# Patient Record
Sex: FEMALE | Race: WHITE | Employment: OTHER | URBAN - METROPOLITAN AREA
[De-identification: names, ages, dates, MRNs, and addresses within clinical notes are randomized per-mention and may not be internally consistent; named-entity substitution may affect disease eponyms.]

---

## 2022-03-18 PROBLEM — N39.0 RECURRENT UTI: Status: ACTIVE | Noted: 2018-10-18

## 2024-11-03 ENCOUNTER — HOSPITAL ENCOUNTER (INPATIENT)
Facility: HOSPITAL | Age: 79
LOS: 2 days | Discharge: HOME | DRG: 313 | End: 2024-11-05
Attending: INTERNAL MEDICINE | Admitting: INTERNAL MEDICINE
Payer: MEDICARE

## 2024-11-03 ENCOUNTER — APPOINTMENT (OUTPATIENT)
Facility: HOSPITAL | Age: 79
DRG: 313 | End: 2024-11-03
Payer: MEDICARE

## 2024-11-03 DIAGNOSIS — R07.9 CHEST PAIN, UNSPECIFIED TYPE: ICD-10-CM

## 2024-11-03 DIAGNOSIS — I25.10 CORONARY ARTERY DISEASE INVOLVING NATIVE CORONARY ARTERY OF NATIVE HEART WITHOUT ANGINA PECTORIS: Primary | ICD-10-CM

## 2024-11-03 DIAGNOSIS — R79.89 ELEVATED TROPONIN: ICD-10-CM

## 2024-11-03 DIAGNOSIS — Z90.5 S/P NEPHRECTOMY: ICD-10-CM

## 2024-11-03 DIAGNOSIS — N18.9 CHRONIC KIDNEY DISEASE, UNSPECIFIED CKD STAGE: ICD-10-CM

## 2024-11-03 PROBLEM — J90 PLEURAL EFFUSION: Status: ACTIVE | Noted: 2024-11-03

## 2024-11-03 LAB
ALBUMIN SERPL-MCNC: 3.3 G/DL (ref 3.4–5)
ALBUMIN/GLOB SERPL: 0.7 (ref 0.8–1.7)
ALP SERPL-CCNC: 114 U/L (ref 45–117)
ALT SERPL-CCNC: 33 U/L (ref 13–56)
ANION GAP SERPL CALC-SCNC: 7 MMOL/L (ref 3–18)
AST SERPL-CCNC: 30 U/L (ref 10–38)
BASOPHILS # BLD: 0 K/UL (ref 0–0.1)
BASOPHILS NFR BLD: 0 % (ref 0–2)
BILIRUB SERPL-MCNC: 0.8 MG/DL (ref 0.2–1)
BUN SERPL-MCNC: 19 MG/DL (ref 7–18)
BUN/CREAT SERPL: 12 (ref 12–20)
CALCIUM SERPL-MCNC: 9.5 MG/DL (ref 8.5–10.1)
CHLORIDE SERPL-SCNC: 100 MMOL/L (ref 100–111)
CO2 SERPL-SCNC: 28 MMOL/L (ref 21–32)
CREAT SERPL-MCNC: 1.55 MG/DL (ref 0.6–1.3)
D DIMER PPP FEU-MCNC: 1.56 UG/ML(FEU)
DIFFERENTIAL METHOD BLD: ABNORMAL
EOSINOPHIL # BLD: 0 K/UL (ref 0–0.4)
EOSINOPHIL NFR BLD: 0 % (ref 0–5)
ERYTHROCYTE [DISTWIDTH] IN BLOOD BY AUTOMATED COUNT: 13.8 % (ref 11.6–14.5)
GLOBULIN SER CALC-MCNC: 4.5 G/DL (ref 2–4)
GLUCOSE BLD STRIP.AUTO-MCNC: 202 MG/DL (ref 70–110)
GLUCOSE SERPL-MCNC: 109 MG/DL (ref 74–99)
HCT VFR BLD AUTO: 35.3 % (ref 35–45)
HGB BLD-MCNC: 11.9 G/DL (ref 12–16)
IMM GRANULOCYTES # BLD AUTO: 0.1 K/UL (ref 0–0.04)
IMM GRANULOCYTES NFR BLD AUTO: 1 % (ref 0–0.5)
LYMPHOCYTES # BLD: 2.7 K/UL (ref 0.9–3.6)
LYMPHOCYTES NFR BLD: 21 % (ref 21–52)
MCH RBC QN AUTO: 30.3 PG (ref 24–34)
MCHC RBC AUTO-ENTMCNC: 33.7 G/DL (ref 31–37)
MCV RBC AUTO: 89.8 FL (ref 78–100)
MONOCYTES # BLD: 1.7 K/UL (ref 0.05–1.2)
MONOCYTES NFR BLD: 13 % (ref 3–10)
NEUTS SEG # BLD: 8.4 K/UL (ref 1.8–8)
NEUTS SEG NFR BLD: 65 % (ref 40–73)
NRBC # BLD: 0 K/UL (ref 0–0.01)
NRBC BLD-RTO: 0 PER 100 WBC
PLATELET # BLD AUTO: 219 K/UL (ref 135–420)
PMV BLD AUTO: 11 FL (ref 9.2–11.8)
POTASSIUM SERPL-SCNC: 3.3 MMOL/L (ref 3.5–5.5)
PROT SERPL-MCNC: 7.8 G/DL (ref 6.4–8.2)
RBC # BLD AUTO: 3.93 M/UL (ref 4.2–5.3)
RBC MORPH BLD: ABNORMAL
SODIUM SERPL-SCNC: 135 MMOL/L (ref 136–145)
TROPONIN I SERPL HS-MCNC: 60 NG/L (ref 0–54)
TROPONIN I SERPL HS-MCNC: 72 NG/L (ref 0–54)
TROPONIN I SERPL HS-MCNC: 72 NG/L (ref 0–54)
WBC # BLD AUTO: 12.9 K/UL (ref 4.6–13.2)

## 2024-11-03 PROCEDURE — 85025 COMPLETE CBC W/AUTO DIFF WBC: CPT

## 2024-11-03 PROCEDURE — 84484 ASSAY OF TROPONIN QUANT: CPT

## 2024-11-03 PROCEDURE — 99285 EMERGENCY DEPT VISIT HI MDM: CPT

## 2024-11-03 PROCEDURE — 3430000000 HC RX DIAGNOSTIC RADIOPHARMACEUTICAL: Performed by: PHYSICIAN ASSISTANT

## 2024-11-03 PROCEDURE — 6370000000 HC RX 637 (ALT 250 FOR IP): Performed by: PHYSICIAN ASSISTANT

## 2024-11-03 PROCEDURE — 1100000003 HC PRIVATE W/ TELEMETRY

## 2024-11-03 PROCEDURE — 71250 CT THORAX DX C-: CPT

## 2024-11-03 PROCEDURE — 2580000003 HC RX 258: Performed by: INTERNAL MEDICINE

## 2024-11-03 PROCEDURE — 82962 GLUCOSE BLOOD TEST: CPT

## 2024-11-03 PROCEDURE — 93005 ELECTROCARDIOGRAM TRACING: CPT | Performed by: PHYSICIAN ASSISTANT

## 2024-11-03 PROCEDURE — 78580 LUNG PERFUSION IMAGING: CPT

## 2024-11-03 PROCEDURE — 85379 FIBRIN DEGRADATION QUANT: CPT

## 2024-11-03 PROCEDURE — A9540 TC99M MAA: HCPCS | Performed by: PHYSICIAN ASSISTANT

## 2024-11-03 PROCEDURE — 6360000002 HC RX W HCPCS: Performed by: INTERNAL MEDICINE

## 2024-11-03 PROCEDURE — 71045 X-RAY EXAM CHEST 1 VIEW: CPT

## 2024-11-03 PROCEDURE — 6370000000 HC RX 637 (ALT 250 FOR IP): Performed by: INTERNAL MEDICINE

## 2024-11-03 PROCEDURE — 80053 COMPREHEN METABOLIC PANEL: CPT

## 2024-11-03 RX ORDER — DEXTROSE MONOHYDRATE 100 MG/ML
INJECTION, SOLUTION INTRAVENOUS CONTINUOUS PRN
Status: DISCONTINUED | OUTPATIENT
Start: 2024-11-03 | End: 2024-11-05 | Stop reason: HOSPADM

## 2024-11-03 RX ORDER — INSULIN LISPRO 100 [IU]/ML
0.05 INJECTION, SOLUTION INTRAVENOUS; SUBCUTANEOUS
Status: DISCONTINUED | OUTPATIENT
Start: 2024-11-04 | End: 2024-11-05 | Stop reason: HOSPADM

## 2024-11-03 RX ORDER — SODIUM CHLORIDE 0.9 % (FLUSH) 0.9 %
5-40 SYRINGE (ML) INJECTION EVERY 12 HOURS SCHEDULED
Status: DISCONTINUED | OUTPATIENT
Start: 2024-11-03 | End: 2024-11-05 | Stop reason: HOSPADM

## 2024-11-03 RX ORDER — POLYETHYLENE GLYCOL 3350 17 G/17G
17 POWDER, FOR SOLUTION ORAL DAILY PRN
Status: DISCONTINUED | OUTPATIENT
Start: 2024-11-03 | End: 2024-11-05 | Stop reason: HOSPADM

## 2024-11-03 RX ORDER — ONDANSETRON 2 MG/ML
4 INJECTION INTRAMUSCULAR; INTRAVENOUS EVERY 6 HOURS PRN
Status: DISCONTINUED | OUTPATIENT
Start: 2024-11-03 | End: 2024-11-05 | Stop reason: HOSPADM

## 2024-11-03 RX ORDER — INSULIN GLARGINE 100 [IU]/ML
0.15 INJECTION, SOLUTION SUBCUTANEOUS DAILY
Status: DISCONTINUED | OUTPATIENT
Start: 2024-11-03 | End: 2024-11-05 | Stop reason: HOSPADM

## 2024-11-03 RX ORDER — CARVEDILOL 3.12 MG/1
3.12 TABLET ORAL 2 TIMES DAILY WITH MEALS
Status: DISCONTINUED | OUTPATIENT
Start: 2024-11-04 | End: 2024-11-05 | Stop reason: HOSPADM

## 2024-11-03 RX ORDER — SODIUM CHLORIDE 9 MG/ML
INJECTION, SOLUTION INTRAVENOUS PRN
Status: DISCONTINUED | OUTPATIENT
Start: 2024-11-03 | End: 2024-11-05 | Stop reason: HOSPADM

## 2024-11-03 RX ORDER — SODIUM CHLORIDE 0.9 % (FLUSH) 0.9 %
5-40 SYRINGE (ML) INJECTION PRN
Status: DISCONTINUED | OUTPATIENT
Start: 2024-11-03 | End: 2024-11-05 | Stop reason: HOSPADM

## 2024-11-03 RX ORDER — ONDANSETRON 2 MG/ML
4 INJECTION INTRAMUSCULAR; INTRAVENOUS EVERY 6 HOURS PRN
Status: DISCONTINUED | OUTPATIENT
Start: 2024-11-03 | End: 2024-11-04 | Stop reason: SDUPTHER

## 2024-11-03 RX ORDER — ONDANSETRON 4 MG/1
4 TABLET, ORALLY DISINTEGRATING ORAL EVERY 8 HOURS PRN
Status: DISCONTINUED | OUTPATIENT
Start: 2024-11-03 | End: 2024-11-04 | Stop reason: SDUPTHER

## 2024-11-03 RX ORDER — MAGNESIUM SULFATE IN WATER 40 MG/ML
2000 INJECTION, SOLUTION INTRAVENOUS PRN
Status: DISCONTINUED | OUTPATIENT
Start: 2024-11-03 | End: 2024-11-03 | Stop reason: SDUPTHER

## 2024-11-03 RX ORDER — ENOXAPARIN SODIUM 100 MG/ML
30 INJECTION SUBCUTANEOUS 2 TIMES DAILY
Status: DISCONTINUED | OUTPATIENT
Start: 2024-11-03 | End: 2024-11-05 | Stop reason: HOSPADM

## 2024-11-03 RX ORDER — ACETAMINOPHEN 325 MG/1
650 TABLET ORAL EVERY 6 HOURS PRN
Status: DISCONTINUED | OUTPATIENT
Start: 2024-11-03 | End: 2024-11-05 | Stop reason: HOSPADM

## 2024-11-03 RX ORDER — ACETAMINOPHEN 325 MG/1
650 TABLET ORAL EVERY 6 HOURS PRN
Status: DISCONTINUED | OUTPATIENT
Start: 2024-11-03 | End: 2024-11-03 | Stop reason: SDUPTHER

## 2024-11-03 RX ORDER — ACETAMINOPHEN 650 MG/1
650 SUPPOSITORY RECTAL EVERY 6 HOURS PRN
Status: DISCONTINUED | OUTPATIENT
Start: 2024-11-03 | End: 2024-11-05 | Stop reason: HOSPADM

## 2024-11-03 RX ORDER — ONDANSETRON 4 MG/1
4 TABLET, ORALLY DISINTEGRATING ORAL EVERY 8 HOURS PRN
Status: DISCONTINUED | OUTPATIENT
Start: 2024-11-03 | End: 2024-11-05 | Stop reason: HOSPADM

## 2024-11-03 RX ORDER — POLYETHYLENE GLYCOL 3350 17 G/17G
17 POWDER, FOR SOLUTION ORAL DAILY PRN
Status: DISCONTINUED | OUTPATIENT
Start: 2024-11-03 | End: 2024-11-03 | Stop reason: SDUPTHER

## 2024-11-03 RX ORDER — MAGNESIUM SULFATE IN WATER 40 MG/ML
2000 INJECTION, SOLUTION INTRAVENOUS PRN
Status: DISCONTINUED | OUTPATIENT
Start: 2024-11-03 | End: 2024-11-05 | Stop reason: HOSPADM

## 2024-11-03 RX ORDER — POTASSIUM CHLORIDE 1500 MG/1
40 TABLET, EXTENDED RELEASE ORAL ONCE
Status: COMPLETED | OUTPATIENT
Start: 2024-11-03 | End: 2024-11-03

## 2024-11-03 RX ORDER — FLUTICASONE PROPIONATE AND SALMETEROL 250; 50 UG/1; UG/1
1 POWDER RESPIRATORY (INHALATION) EVERY 12 HOURS
COMMUNITY

## 2024-11-03 RX ORDER — INSULIN LISPRO 100 [IU]/ML
0-4 INJECTION, SOLUTION INTRAVENOUS; SUBCUTANEOUS
Status: DISCONTINUED | OUTPATIENT
Start: 2024-11-03 | End: 2024-11-05 | Stop reason: HOSPADM

## 2024-11-03 RX ORDER — ACETAMINOPHEN 650 MG/1
650 SUPPOSITORY RECTAL EVERY 6 HOURS PRN
Status: DISCONTINUED | OUTPATIENT
Start: 2024-11-03 | End: 2024-11-03 | Stop reason: SDUPTHER

## 2024-11-03 RX ORDER — ATORVASTATIN CALCIUM 20 MG/1
80 TABLET, FILM COATED ORAL NIGHTLY
Status: DISCONTINUED | OUTPATIENT
Start: 2024-11-03 | End: 2024-11-05 | Stop reason: HOSPADM

## 2024-11-03 RX ORDER — NITROGLYCERIN 0.4 MG/1
0.4 TABLET SUBLINGUAL EVERY 5 MIN PRN
Status: DISCONTINUED | OUTPATIENT
Start: 2024-11-03 | End: 2024-11-05 | Stop reason: HOSPADM

## 2024-11-03 RX ORDER — GLUCAGON 1 MG/ML
1 KIT INJECTION PRN
Status: DISCONTINUED | OUTPATIENT
Start: 2024-11-03 | End: 2024-11-05 | Stop reason: HOSPADM

## 2024-11-03 RX ADMIN — ENOXAPARIN SODIUM 30 MG: 100 INJECTION SUBCUTANEOUS at 22:04

## 2024-11-03 RX ADMIN — SODIUM CHLORIDE, PRESERVATIVE FREE 10 ML: 5 INJECTION INTRAVENOUS at 22:49

## 2024-11-03 RX ADMIN — INSULIN LISPRO 1 UNITS: 100 INJECTION, SOLUTION INTRAVENOUS; SUBCUTANEOUS at 22:07

## 2024-11-03 RX ADMIN — INSULIN GLARGINE 15 UNITS: 100 INJECTION, SOLUTION SUBCUTANEOUS at 22:06

## 2024-11-03 RX ADMIN — KIT FOR THE PREPARATION OF TECHNETIUM TC 99M ALBUMIN AGGREGATED 7.2 MILLICURIE: 2.5 INJECTION, POWDER, FOR SOLUTION INTRAVENOUS at 19:48

## 2024-11-03 RX ADMIN — POTASSIUM CHLORIDE 40 MEQ: 1500 TABLET, EXTENDED RELEASE ORAL at 22:48

## 2024-11-03 RX ADMIN — ALUMINUM HYDROXIDE, MAGNESIUM HYDROXIDE, AND SIMETHICONE 40 ML: 200; 200; 20 SUSPENSION ORAL at 18:27

## 2024-11-03 RX ADMIN — ATORVASTATIN CALCIUM 80 MG: 20 TABLET, FILM COATED ORAL at 22:00

## 2024-11-03 ASSESSMENT — PAIN SCALES - GENERAL: PAINLEVEL_OUTOF10: 3

## 2024-11-03 ASSESSMENT — PAIN DESCRIPTION - LOCATION: LOCATION: NECK

## 2024-11-03 NOTE — ED TRIAGE NOTES
Pt arrived by EMS c/o pain on sides of neck bilat. Pt able to wave her hands above her head without complaints. Pt states L arm pain last night, but pain has now moved to neck. Denies SOB, denies chest pain at this time.     Hx COPD, asthma, diabetes. Daily smoker \"her whole life\".    324mg asa en route.

## 2024-11-03 NOTE — ED PROVIDER NOTES
[facility] EMERGENCY DEPT  EMERGENCY DEPARTMENT ENCOUNTER       Pt Name: Veronica Melgoza  MRN: [id number]  Birthdate 1945  Date of evaluation: 11/3/2024  PCP: Nae Montgomery MD  Note Started: 7:39 PM 11/3/24     CHIEF COMPLAINT       Chief Complaint   Patient presents with    Neck Pain        HISTORY OF PRESENT ILLNESS: 1 or more elements      History From: Patient and Patient's Daughter  HPI Limitations: None  Chronic Conditions: DM, depression, bladder Ca, depression, CKD, arthritis, HLD, COPD, DM, cervical fusion, chronic low back pain  Social Determinants affecting Dx or Tx: none      Veronica Melgoza is a 78 y.o. female who presents to ED from home via EMS c/o upper chest pain across her clavicles radiating to her left upper arm and anterior neck onset yesterday evening.  She states that if she is sitting still the pain is minimal but if she takes a deep breath or moves a certain way or lays flat then it is worse and is associated with slight shortness of breath.  EMS gave 324 chewable aspirin en route.  He has a history of cervical fusion but denies any neck pain or chronic neck issues.  She has chronic low back pain and is awaiting an injection next week.  She denies fever, cough, congestion, any other symptoms or complaints.  She also states she has been belching a little more last couple days.  She has a history of right thyroid nodule which was biopsied within the past year and states this workup was negative     Nursing Notes were all reviewed and agreed with or any disagreements were addressed in the HPI.    PAST HISTORY     Past Medical History:  Past Medical History:   Diagnosis Date    Arthropathy, unspecified, site unspecified     Asthma     Bladder CA in situ     Depression     Essential hypertension     Kidney disease        Past Surgical History:  Past Surgical History:   Procedure Laterality Date    CHOLECYSTECTOMY      INTRAOCULAR LENS INSERTION      NEPHRECTOMY      Right    ORTHOPEDIC SURGERY

## 2024-11-04 ENCOUNTER — APPOINTMENT (OUTPATIENT)
Facility: HOSPITAL | Age: 79
DRG: 313 | End: 2024-11-04
Attending: INTERNAL MEDICINE
Payer: MEDICARE

## 2024-11-04 ENCOUNTER — APPOINTMENT (OUTPATIENT)
Facility: HOSPITAL | Age: 79
DRG: 313 | End: 2024-11-04
Payer: MEDICARE

## 2024-11-04 PROBLEM — E11.9 DM (DIABETES MELLITUS) (HCC): Status: ACTIVE | Noted: 2024-11-04

## 2024-11-04 LAB
ALBUMIN SERPL-MCNC: 2.9 G/DL (ref 3.4–5)
ALBUMIN/GLOB SERPL: 0.6 (ref 0.8–1.7)
ALP SERPL-CCNC: 103 U/L (ref 45–117)
ALT SERPL-CCNC: 30 U/L (ref 13–56)
ANION GAP SERPL CALC-SCNC: 6 MMOL/L (ref 3–18)
AST SERPL-CCNC: 27 U/L (ref 10–38)
BILIRUB SERPL-MCNC: 0.9 MG/DL (ref 0.2–1)
BUN SERPL-MCNC: 20 MG/DL (ref 7–18)
BUN/CREAT SERPL: 14 (ref 12–20)
CALCIUM SERPL-MCNC: 9.2 MG/DL (ref 8.5–10.1)
CHLORIDE SERPL-SCNC: 102 MMOL/L (ref 100–111)
CO2 SERPL-SCNC: 28 MMOL/L (ref 21–32)
CREAT SERPL-MCNC: 1.38 MG/DL (ref 0.6–1.3)
ECHO AO ASC DIAM: 3.3 CM
ECHO AO ASCENDING AORTA INDEX: 1.6 CM/M2
ECHO AO ROOT DIAM: 3.2 CM
ECHO AO ROOT INDEX: 1.55 CM/M2
ECHO AV AREA PEAK VELOCITY: 2.2 CM2
ECHO AV AREA VTI: 2.1 CM2
ECHO AV AREA/BSA PEAK VELOCITY: 1.1 CM2/M2
ECHO AV AREA/BSA VTI: 1 CM2/M2
ECHO AV MEAN GRADIENT: 4 MMHG
ECHO AV MEAN VELOCITY: 0.9 M/S
ECHO AV PEAK GRADIENT: 7 MMHG
ECHO AV PEAK VELOCITY: 1.3 M/S
ECHO AV VELOCITY RATIO: 0.69
ECHO AV VTI: 29.8 CM
ECHO BSA: 2.15 M2
ECHO BSA: 2.15 M2
ECHO EST RA PRESSURE: 3 MMHG
ECHO LA DIAMETER INDEX: 1.65 CM/M2
ECHO LA DIAMETER: 3.4 CM
ECHO LA TO AORTIC ROOT RATIO: 1.06
ECHO LA VOL A-L A2C: 55 ML (ref 22–52)
ECHO LA VOL A-L A4C: 47 ML (ref 22–52)
ECHO LA VOL BP: 51 ML (ref 22–52)
ECHO LA VOL MOD A2C: 53 ML (ref 22–52)
ECHO LA VOL MOD A4C: 44 ML (ref 22–52)
ECHO LA VOL/BSA BIPLANE: 25 ML/M2 (ref 16–34)
ECHO LA VOLUME AREA LENGTH: 54 ML
ECHO LA VOLUME INDEX A-L A2C: 27 ML/M2 (ref 16–34)
ECHO LA VOLUME INDEX A-L A4C: 23 ML/M2 (ref 16–34)
ECHO LA VOLUME INDEX AREA LENGTH: 26 ML/M2 (ref 16–34)
ECHO LA VOLUME INDEX MOD A2C: 26 ML/M2 (ref 16–34)
ECHO LA VOLUME INDEX MOD A4C: 21 ML/M2 (ref 16–34)
ECHO LV E' LATERAL VELOCITY: 6.2 CM/S
ECHO LV E' SEPTAL VELOCITY: 4.5 CM/S
ECHO LV EJECTION FRACTION A2C: 53 %
ECHO LV EJECTION FRACTION A4C: 42 %
ECHO LV EJECTION FRACTION BIPLANE: 48 % (ref 55–100)
ECHO LV FRACTIONAL SHORTENING: 34 % (ref 28–44)
ECHO LV GLOBAL LONGITUDINAL STRAIN (GLS): -11 %
ECHO LV GLOBAL LONGITUDINAL STRAIN (GLS): -11.7 %
ECHO LV GLOBAL LONGITUDINAL STRAIN (GLS): -12.7 %
ECHO LV GLOBAL LONGITUDINAL STRAIN (GLS): -15.4 %
ECHO LV INTERNAL DIMENSION DIASTOLE INDEX: 2.14 CM/M2
ECHO LV INTERNAL DIMENSION DIASTOLIC: 4.4 CM (ref 3.9–5.3)
ECHO LV INTERNAL DIMENSION SYSTOLIC INDEX: 1.41 CM/M2
ECHO LV INTERNAL DIMENSION SYSTOLIC: 2.9 CM
ECHO LV IVSD: 0.9 CM (ref 0.6–0.9)
ECHO LV MASS 2D: 137.8 G (ref 67–162)
ECHO LV MASS INDEX 2D: 66.9 G/M2 (ref 43–95)
ECHO LV POSTERIOR WALL DIASTOLIC: 1 CM (ref 0.6–0.9)
ECHO LV RELATIVE WALL THICKNESS RATIO: 0.45
ECHO LVOT AREA: 3.1 CM2
ECHO LVOT AV VTI INDEX: 0.69
ECHO LVOT DIAM: 2 CM
ECHO LVOT MEAN GRADIENT: 2 MMHG
ECHO LVOT PEAK GRADIENT: 3 MMHG
ECHO LVOT PEAK VELOCITY: 0.9 M/S
ECHO LVOT STROKE VOLUME INDEX: 31.4 ML/M2
ECHO LVOT SV: 64.7 ML
ECHO LVOT VTI: 20.6 CM
ECHO MV A VELOCITY: 1 M/S
ECHO MV AREA VTI: 1.7 CM2
ECHO MV E DECELERATION TIME (DT): 165.7 MS
ECHO MV E VELOCITY: 0.85 M/S
ECHO MV E/A RATIO: 0.85
ECHO MV E/E' LATERAL: 13.71
ECHO MV E/E' RATIO (AVERAGED): 16.3
ECHO MV E/E' SEPTAL: 18.89
ECHO MV LVOT VTI INDEX: 1.84
ECHO MV MAX VELOCITY: 1.2 M/S
ECHO MV MEAN GRADIENT: 2 MMHG
ECHO MV MEAN VELOCITY: 0.7 M/S
ECHO MV PEAK GRADIENT: 6 MMHG
ECHO MV VTI: 37.9 CM
ECHO PV MAX VELOCITY: 1 M/S
ECHO PV MEAN GRADIENT: 2 MMHG
ECHO PV MEAN VELOCITY: 0.7 M/S
ECHO PV PEAK GRADIENT: 4 MMHG
ECHO RA END SYSTOLIC VOLUME APICAL 4 CHAMBER INDEX BSA: 15 ML/M2
ECHO RA VOLUME: 30 ML
ECHO RIGHT VENTRICULAR SYSTOLIC PRESSURE (RVSP): 23 MMHG
ECHO RV FREE WALL PEAK S': 11 CM/S
ECHO RV INTERNAL DIMENSION: 3 CM
ECHO RV TAPSE: 1.8 CM (ref 1.7–?)
ECHO RVOT MEAN GRADIENT: 1 MMHG
ECHO RVOT PEAK GRADIENT: 2 MMHG
ECHO RVOT PEAK VELOCITY: 0.7 M/S
ECHO RVOT VTI: 16.9 CM
ECHO TV REGURGITANT MAX VELOCITY: 2.22 M/S
ECHO TV REGURGITANT PEAK GRADIENT: 20 MMHG
ERYTHROCYTE [DISTWIDTH] IN BLOOD BY AUTOMATED COUNT: 13.9 % (ref 11.6–14.5)
EST. AVERAGE GLUCOSE BLD GHB EST-MCNC: 120 MG/DL
GLOBULIN SER CALC-MCNC: 5 G/DL (ref 2–4)
GLUCOSE BLD STRIP.AUTO-MCNC: 101 MG/DL (ref 70–110)
GLUCOSE BLD STRIP.AUTO-MCNC: 116 MG/DL (ref 70–110)
GLUCOSE BLD STRIP.AUTO-MCNC: 123 MG/DL (ref 70–110)
GLUCOSE BLD STRIP.AUTO-MCNC: 146 MG/DL (ref 70–110)
GLUCOSE SERPL-MCNC: 106 MG/DL (ref 74–99)
HBA1C MFR BLD: 5.8 % (ref 4.2–5.6)
HCT VFR BLD AUTO: 34.2 % (ref 35–45)
HGB BLD-MCNC: 11.4 G/DL (ref 12–16)
MAGNESIUM SERPL-MCNC: 1.7 MG/DL (ref 1.6–2.6)
MCH RBC QN AUTO: 30.2 PG (ref 24–34)
MCHC RBC AUTO-ENTMCNC: 33.3 G/DL (ref 31–37)
MCV RBC AUTO: 90.7 FL (ref 78–100)
NRBC # BLD: 0 K/UL (ref 0–0.01)
NRBC BLD-RTO: 0 PER 100 WBC
PLATELET # BLD AUTO: 198 K/UL (ref 135–420)
PMV BLD AUTO: 11.9 FL (ref 9.2–11.8)
POTASSIUM SERPL-SCNC: 3.3 MMOL/L (ref 3.5–5.5)
PROT SERPL-MCNC: 7.9 G/DL (ref 6.4–8.2)
RBC # BLD AUTO: 3.77 M/UL (ref 4.2–5.3)
SODIUM SERPL-SCNC: 136 MMOL/L (ref 136–145)
TROPONIN I SERPL HS-MCNC: 181 NG/L (ref 0–54)
TROPONIN I SERPL HS-MCNC: 72 NG/L (ref 0–54)
TROPONIN I SERPL HS-MCNC: 94 NG/L (ref 0–54)
WBC # BLD AUTO: 9.3 K/UL (ref 4.6–13.2)

## 2024-11-04 PROCEDURE — 93970 EXTREMITY STUDY: CPT

## 2024-11-04 PROCEDURE — 1100000003 HC PRIVATE W/ TELEMETRY

## 2024-11-04 PROCEDURE — 84484 ASSAY OF TROPONIN QUANT: CPT

## 2024-11-04 PROCEDURE — 6370000000 HC RX 637 (ALT 250 FOR IP): Performed by: HOSPITALIST

## 2024-11-04 PROCEDURE — 36415 COLL VENOUS BLD VENIPUNCTURE: CPT

## 2024-11-04 PROCEDURE — 83036 HEMOGLOBIN GLYCOSYLATED A1C: CPT

## 2024-11-04 PROCEDURE — 93306 TTE W/DOPPLER COMPLETE: CPT

## 2024-11-04 PROCEDURE — 6370000000 HC RX 637 (ALT 250 FOR IP): Performed by: INTERNAL MEDICINE

## 2024-11-04 PROCEDURE — 6360000002 HC RX W HCPCS: Performed by: INTERNAL MEDICINE

## 2024-11-04 PROCEDURE — 83735 ASSAY OF MAGNESIUM: CPT

## 2024-11-04 PROCEDURE — 85027 COMPLETE CBC AUTOMATED: CPT

## 2024-11-04 PROCEDURE — 2580000003 HC RX 258: Performed by: INTERNAL MEDICINE

## 2024-11-04 PROCEDURE — 80053 COMPREHEN METABOLIC PANEL: CPT

## 2024-11-04 PROCEDURE — 82962 GLUCOSE BLOOD TEST: CPT

## 2024-11-04 RX ORDER — POTASSIUM CHLORIDE 1500 MG/1
40 TABLET, EXTENDED RELEASE ORAL ONCE
Status: COMPLETED | OUTPATIENT
Start: 2024-11-04 | End: 2024-11-04

## 2024-11-04 RX ADMIN — POTASSIUM CHLORIDE 40 MEQ: 1500 TABLET, EXTENDED RELEASE ORAL at 09:49

## 2024-11-04 RX ADMIN — SODIUM CHLORIDE, PRESERVATIVE FREE 10 ML: 5 INJECTION INTRAVENOUS at 08:44

## 2024-11-04 RX ADMIN — ENOXAPARIN SODIUM 30 MG: 100 INJECTION SUBCUTANEOUS at 08:43

## 2024-11-04 RX ADMIN — INSULIN LISPRO 5 UNITS: 100 INJECTION, SOLUTION INTRAVENOUS; SUBCUTANEOUS at 16:39

## 2024-11-04 RX ADMIN — ACETAMINOPHEN 650 MG: 325 TABLET ORAL at 20:55

## 2024-11-04 RX ADMIN — SODIUM CHLORIDE, PRESERVATIVE FREE 10 ML: 5 INJECTION INTRAVENOUS at 20:52

## 2024-11-04 RX ADMIN — ENOXAPARIN SODIUM 30 MG: 100 INJECTION SUBCUTANEOUS at 20:53

## 2024-11-04 RX ADMIN — CARVEDILOL 3.12 MG: 3.12 TABLET, FILM COATED ORAL at 08:43

## 2024-11-04 RX ADMIN — SODIUM CHLORIDE, PRESERVATIVE FREE 10 ML: 5 INJECTION INTRAVENOUS at 08:43

## 2024-11-04 RX ADMIN — INSULIN LISPRO 5 UNITS: 100 INJECTION, SOLUTION INTRAVENOUS; SUBCUTANEOUS at 11:48

## 2024-11-04 RX ADMIN — ATORVASTATIN CALCIUM 80 MG: 20 TABLET, FILM COATED ORAL at 20:52

## 2024-11-04 RX ADMIN — CARVEDILOL 3.12 MG: 3.12 TABLET, FILM COATED ORAL at 16:38

## 2024-11-04 ASSESSMENT — PAIN SCALES - GENERAL
PAINLEVEL_OUTOF10: 0
PAINLEVEL_OUTOF10: 6
PAINLEVEL_OUTOF10: 0
PAINLEVEL_OUTOF10: 6
PAINLEVEL_OUTOF10: 8

## 2024-11-04 ASSESSMENT — PAIN DESCRIPTION - ORIENTATION
ORIENTATION: LEFT
ORIENTATION: LEFT

## 2024-11-04 ASSESSMENT — PAIN SCALES - WONG BAKER
WONGBAKER_NUMERICALRESPONSE: HURTS LITTLE MORE
WONGBAKER_NUMERICALRESPONSE: NO HURT

## 2024-11-04 ASSESSMENT — PAIN DESCRIPTION - DESCRIPTORS: DESCRIPTORS: ACHING

## 2024-11-04 ASSESSMENT — PAIN DESCRIPTION - LOCATION
LOCATION: BACK;SHOULDER
LOCATION: NECK

## 2024-11-04 NOTE — H&P
Glucose:   Recent Labs     11/03/24  1732   GLUCOSE 109*      POC Glucose: No results found for: \"POCGLU\"   Last 3 POC Glucose: No results for input(s): \"POCGLU\" in the last 72 hours.   Last 3 CK, CKMB, Troponin: No results for input(s): \"CKTOTAL\", \"CKMB\", \"TROPONINI\" in the last 72 hours.   CBC:   Lab Results   Component Value Date/Time    WBC 12.9 11/03/2024 05:32 PM    RBC 3.93 11/03/2024 05:32 PM    HGB 11.9 11/03/2024 05:32 PM    HCT 35.3 11/03/2024 05:32 PM    MCV 89.8 11/03/2024 05:32 PM    MCH 30.3 11/03/2024 05:32 PM    MCHC 33.7 11/03/2024 05:32 PM    RDW 13.8 11/03/2024 05:32 PM     11/03/2024 05:32 PM    MPV 11.0 11/03/2024 05:32 PM      Last 3 Hemoglobin/Hematocrit:   Recent Labs     11/03/24  1732   HGB 11.9*   HCT 35.3      Recent Labs     11/03/24  1732   ALKPHOS 114   ALT 33   AST 30   BILITOT 0.8      Albumin: No results found for: \"LABALBU\"   Calcium:   Lab Results   Component Value Date/Time    CALCIUM 9.5 11/03/2024 05:32 PM      Ionized Calcium: No components found for: \"IONCA\"         Imaging results last 24 hrs :XR CHEST PORTABLE    Result Date: 11/3/2024  EXAM:  XR CHEST PORTABLE INDICATION:   Chest Pain COMPARISON: None. FINDINGS: AP radiograph of the chest was obtained. Small left pleural effusion with left basilar atelectasis. No pneumothorax. Heart size is normal. Calcifications of the thoracic aorta. Cervical spine ACDF. No acute osseous abnormalities.     1. Small left pleural effusion and left basilar atelectasis. Electronically signed by Thor Kaur    Imaging results impression onlyXR CHEST PORTABLE    Result Date: 11/3/2024  1. Small left pleural effusion and left basilar atelectasis. Electronically signed by Thor Kaur     NM LUNG SCAN PERFUSION ONLY   Final Result   Normal perfusion lung imaging study. Probability of pulmonary   embolism is low.                  Electronically signed by DELMA LOFTON      XR CHEST PORTABLE   Final Result   1. Small left pleural

## 2024-11-04 NOTE — PLAN OF CARE
Problem: Discharge Planning  Goal: Discharge to home or other facility with appropriate resources  Outcome: Progressing  Flowsheets (Taken 11/3/2024 2236)  Discharge to home or other facility with appropriate resources:   Identify barriers to discharge with patient and caregiver   Identify discharge learning needs (meds, wound care, etc)     Problem: Safety - Adult  Goal: Free from fall injury  Outcome: Progressing     Problem: Pain  Goal: Verbalizes/displays adequate comfort level or baseline comfort level  Outcome: Progressing

## 2024-11-04 NOTE — PROCEDURES
PROCEDURE NOTE  Date: 11/4/2024   Name: Veronica Melgoza  YOB: 1945    ProceduresNuclear Stress Test    Pt had VQ scan on 11/3/24. Cannot do Nuclear Stress test until 11/5/24 due to it using same isotope. Will notify MD and RN.

## 2024-11-04 NOTE — CARE COORDINATION
Patient admitted from home. Patient lives at home with daughter in law and stated that she gets assist from them as needed at home. Patient no longer drives and stated that her family gets her to and from apts. Patient stated that she is able to obtain meds as needed without barriers. Plan is for cardiology work up. Patient does not have a cardiologist therefore will need a follow up apt prior to DC. PT/OT to see patient in acute setting prior to DC. SW provided patient info card to follow up with CM team as needed. SW to follow.   11/04/24 1053   Service Assessment   Patient Orientation Alert and Oriented   Cognition Alert   History Provided By Patient   Primary Caregiver Self   Support Systems Family Members   Patient's Healthcare Decision Maker is: Named in Scanned ACP Document   PCP Verified by CM Yes   Last Visit to PCP Within last 3 months   Prior Functional Level Independent in ADLs/IADLs   Current Functional Level Independent in ADLs/IADLs   Can patient return to prior living arrangement No   Ability to make needs known: Good   Family able to assist with home care needs: Yes   Would you like for me to discuss the discharge plan with any other family members/significant others, and if so, who? No   Financial Resources Other (Comment)   Community Resources None   CM/SW Referral Other (see comment)   Social/Functional History   Lives With Family   Type of Home House   Home Layout One level   ADL Assistance Independent   Homemaking Assistance Independent   Homemaking Responsibilities Yes   Ambulation Assistance Needs assistance   Active  No   Patient's  Info daughter to transport   Mode of Transportation Car   Occupation Retired   Discharge Planning   Type of Residence House   Living Arrangements Family Members   Current Services Prior To Admission None   Potential Assistance Needed Other (Comment)   DME Ordered? No   Potential Assistance Purchasing Medications No   Type of Home Care Services None

## 2024-11-04 NOTE — CONSULTS
[facility] Consult Note      Patient: Veronica Melgoza MRN: [id number]  SSN: [id number]    YOB: 1945  Age: 78 y.o.  Sex: female    Date of Consultation: 11/4/2024    Reason for Consultation: Chest pain/neck pain    HPI:  I was asked by Ele Mckinley to see this pleasant patient for chest pain. 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 came to the hospital with left shoulder and neck pain from last 4 days with minimal troponin elevation  Chest pain/neck pain is intermittent and have no relationship to physical activity  No known history of CAD  Denied any history of DVT or pulmonary embolism or prolonged bedrest             Past Medical History:   Diagnosis Date    Arthropathy, unspecified, site unspecified     Asthma     Bladder CA in situ     Depression     Essential hypertension     Kidney disease      Past Surgical History:   Procedure Laterality Date    CHOLECYSTECTOMY      INTRAOCULAR LENS INSERTION      NEPHRECTOMY      Right    ORTHOPEDIC SURGERY      Neck    OTHER SURGICAL HISTORY      Bladder Tumor     Current Facility-Administered Medications   Medication Dose Route Frequency    glucose chewable tablet 16 g  4 tablet Oral PRN    dextrose bolus 10% 125 mL  125 mL IntraVENous PRN    Or    dextrose bolus 10% 250 mL  250 mL IntraVENous PRN    glucagon injection 1 mg  1 mg SubCUTAneous PRN    dextrose 10 % infusion   IntraVENous Continuous PRN    insulin glargine (LANTUS) injection vial 15 Units  0.15 Units/kg SubCUTAneous Daily    insulin lispro (HUMALOG,ADMELOG) injection vial 5 Units  0.05 Units/kg SubCUTAneous TID WC    insulin lispro (HUMALOG,ADMELOG) injection vial 0-4 Units  0-4 Units SubCUTAneous 4x Daily AC & HS    sodium chloride flush 0.9 % injection 5-40 mL  5-40 mL IntraVENous 2 times per day    sodium chloride flush 0.9 % injection 5-40 mL

## 2024-11-04 NOTE — PLAN OF CARE
Problem: Discharge Planning  Goal: Discharge to home or other facility with appropriate resources  11/4/2024 1218 by Zenobia Huerta RN  Outcome: Progressing  Flowsheets (Taken 11/4/2024 1218)  Discharge to home or other facility with appropriate resources:   Identify barriers to discharge with patient and caregiver   Arrange for needed discharge resources and transportation as appropriate   Identify discharge learning needs (meds, wound care, etc)   Refer to discharge planning if patient needs post-hospital services based on physician order or complex needs related to functional status, cognitive ability or social support system  11/4/2024 0025 by Elia Stringer RN  Outcome: Progressing  Flowsheets (Taken 11/3/2024 2236)  Discharge to home or other facility with appropriate resources:   Identify barriers to discharge with patient and caregiver   Identify discharge learning needs (meds, wound care, etc)     Problem: Safety - Adult  Goal: Free from fall injury  11/4/2024 1218 by Zenobia Huerta RN  Outcome: Progressing  11/4/2024 0025 by Elia Stringer RN  Outcome: Progressing     Problem: Pain  Goal: Verbalizes/displays adequate comfort level or baseline comfort level  11/4/2024 1218 by Zenobia Huerta RN  Outcome: Progressing  Flowsheets (Taken 11/4/2024 1218)  Verbalizes/displays adequate comfort level or baseline comfort level:   Encourage patient to monitor pain and request assistance   Assess pain using appropriate pain scale   Administer analgesics based on type and severity of pain and evaluate response   Implement non-pharmacological measures as appropriate and evaluate response   Consider cultural and social influences on pain and pain management   Notify Licensed Independent Practitioner if interventions unsuccessful or patient reports new pain  11/4/2024 0025 by Elia Stringer RN  Outcome: Progressing     Problem: Chronic Conditions and Co-morbidities  Goal: Patient's chronic conditions and co-morbidity symptoms

## 2024-11-05 ENCOUNTER — APPOINTMENT (OUTPATIENT)
Facility: HOSPITAL | Age: 79
DRG: 313 | End: 2024-11-05
Payer: MEDICARE

## 2024-11-05 VITALS
DIASTOLIC BLOOD PRESSURE: 71 MMHG | SYSTOLIC BLOOD PRESSURE: 145 MMHG | OXYGEN SATURATION: 99 % | RESPIRATION RATE: 18 BRPM | HEART RATE: 72 BPM | HEIGHT: 64 IN | WEIGHT: 227.51 LBS | BODY MASS INDEX: 38.84 KG/M2 | TEMPERATURE: 98.4 F

## 2024-11-05 PROBLEM — R07.9 CHEST PAIN: Status: RESOLVED | Noted: 2024-11-03 | Resolved: 2024-11-05

## 2024-11-05 LAB
ALBUMIN SERPL-MCNC: 3 G/DL (ref 3.4–5)
ALBUMIN/GLOB SERPL: 0.6 (ref 0.8–1.7)
ALP SERPL-CCNC: 132 U/L (ref 45–117)
ALT SERPL-CCNC: 46 U/L (ref 13–56)
ANION GAP SERPL CALC-SCNC: 6 MMOL/L (ref 3–18)
AST SERPL-CCNC: 57 U/L (ref 10–38)
BILIRUB SERPL-MCNC: 0.7 MG/DL (ref 0.2–1)
BUN SERPL-MCNC: 20 MG/DL (ref 7–18)
BUN/CREAT SERPL: 16 (ref 12–20)
CALCIUM SERPL-MCNC: 9 MG/DL (ref 8.5–10.1)
CHLORIDE SERPL-SCNC: 106 MMOL/L (ref 100–111)
CO2 SERPL-SCNC: 24 MMOL/L (ref 21–32)
CREAT SERPL-MCNC: 1.27 MG/DL (ref 0.6–1.3)
ECHO BSA: 2.15 M2
EKG ATRIAL RATE: 78 BPM
EKG ATRIAL RATE: 78 BPM
EKG DIAGNOSIS: NORMAL
EKG P AXIS: 43 DEGREES
EKG P AXIS: 47 DEGREES
EKG P-R INTERVAL: 144 MS
EKG P-R INTERVAL: 154 MS
EKG Q-T INTERVAL: 362 MS
EKG Q-T INTERVAL: 374 MS
EKG QRS DURATION: 90 MS
EKG QRS DURATION: 92 MS
EKG QTC CALCULATION (BAZETT): 412 MS
EKG QTC CALCULATION (BAZETT): 426 MS
EKG R AXIS: -15 DEGREES
EKG R AXIS: -21 DEGREES
EKG T AXIS: 51 DEGREES
EKG T AXIS: 62 DEGREES
EKG VENTRICULAR RATE: 78 BPM
EKG VENTRICULAR RATE: 78 BPM
ERYTHROCYTE [DISTWIDTH] IN BLOOD BY AUTOMATED COUNT: 13.9 % (ref 11.6–14.5)
GLOBULIN SER CALC-MCNC: 4.8 G/DL (ref 2–4)
GLUCOSE BLD STRIP.AUTO-MCNC: 126 MG/DL (ref 70–110)
GLUCOSE BLD STRIP.AUTO-MCNC: 127 MG/DL (ref 70–110)
GLUCOSE BLD STRIP.AUTO-MCNC: 131 MG/DL (ref 70–110)
GLUCOSE SERPL-MCNC: 112 MG/DL (ref 74–99)
HCT VFR BLD AUTO: 33.7 % (ref 35–45)
HGB BLD-MCNC: 11.2 G/DL (ref 12–16)
MAGNESIUM SERPL-MCNC: 1.8 MG/DL (ref 1.6–2.6)
MCH RBC QN AUTO: 30.4 PG (ref 24–34)
MCHC RBC AUTO-ENTMCNC: 33.2 G/DL (ref 31–37)
MCV RBC AUTO: 91.6 FL (ref 78–100)
NRBC # BLD: 0 K/UL (ref 0–0.01)
NRBC BLD-RTO: 0 PER 100 WBC
NUC STRESS EJECTION FRACTION: 58 %
PLATELET # BLD AUTO: 202 K/UL (ref 135–420)
PMV BLD AUTO: 12.1 FL (ref 9.2–11.8)
POTASSIUM SERPL-SCNC: 3.6 MMOL/L (ref 3.5–5.5)
PROT SERPL-MCNC: 7.8 G/DL (ref 6.4–8.2)
RBC # BLD AUTO: 3.68 M/UL (ref 4.2–5.3)
SODIUM SERPL-SCNC: 136 MMOL/L (ref 136–145)
STRESS BASELINE DIAS BP: 67 MMHG
STRESS BASELINE HR: 67 BPM
STRESS BASELINE SYS BP: 139 MMHG
STRESS ESTIMATED WORKLOAD: 1 METS
STRESS PEAK DIAS BP: 67 MMHG
STRESS PEAK SYS BP: 139 MMHG
STRESS PERCENT HR ACHIEVED: 54 %
STRESS POST PEAK HR: 76 BPM
STRESS RATE PRESSURE PRODUCT: NORMAL BPM*MMHG
STRESS TARGET HR: 142 BPM
TID: 1.23
WBC # BLD AUTO: 9.2 K/UL (ref 4.6–13.2)

## 2024-11-05 PROCEDURE — 82962 GLUCOSE BLOOD TEST: CPT

## 2024-11-05 PROCEDURE — 97161 PT EVAL LOW COMPLEX 20 MIN: CPT

## 2024-11-05 PROCEDURE — 97165 OT EVAL LOW COMPLEX 30 MIN: CPT

## 2024-11-05 PROCEDURE — 2580000003 HC RX 258: Performed by: INTERNAL MEDICINE

## 2024-11-05 PROCEDURE — 6370000000 HC RX 637 (ALT 250 FOR IP): Performed by: HOSPITALIST

## 2024-11-05 PROCEDURE — A9500 TC99M SESTAMIBI: HCPCS | Performed by: INTERNAL MEDICINE

## 2024-11-05 PROCEDURE — 80053 COMPREHEN METABOLIC PANEL: CPT

## 2024-11-05 PROCEDURE — 93010 ELECTROCARDIOGRAM REPORT: CPT | Performed by: INTERNAL MEDICINE

## 2024-11-05 PROCEDURE — 36415 COLL VENOUS BLD VENIPUNCTURE: CPT

## 2024-11-05 PROCEDURE — 6360000002 HC RX W HCPCS: Performed by: INTERNAL MEDICINE

## 2024-11-05 PROCEDURE — 6370000000 HC RX 637 (ALT 250 FOR IP): Performed by: INTERNAL MEDICINE

## 2024-11-05 PROCEDURE — 83735 ASSAY OF MAGNESIUM: CPT

## 2024-11-05 PROCEDURE — 93017 CV STRESS TEST TRACING ONLY: CPT

## 2024-11-05 PROCEDURE — 85027 COMPLETE CBC AUTOMATED: CPT

## 2024-11-05 PROCEDURE — 3430000000 HC RX DIAGNOSTIC RADIOPHARMACEUTICAL: Performed by: INTERNAL MEDICINE

## 2024-11-05 RX ORDER — TETRAKIS(2-METHOXYISOBUTYLISOCYANIDE)COPPER(I) TETRAFLUOROBORATE 1 MG/ML
8.8 INJECTION, POWDER, LYOPHILIZED, FOR SOLUTION INTRAVENOUS
Status: COMPLETED | OUTPATIENT
Start: 2024-11-05 | End: 2024-11-05

## 2024-11-05 RX ORDER — TETRAKIS(2-METHOXYISOBUTYLISOCYANIDE)COPPER(I) TETRAFLUOROBORATE 1 MG/ML
29.1 INJECTION, POWDER, LYOPHILIZED, FOR SOLUTION INTRAVENOUS
Status: COMPLETED | OUTPATIENT
Start: 2024-11-05 | End: 2024-11-05

## 2024-11-05 RX ORDER — LIDOCAINE 4 G/G
1 PATCH TOPICAL DAILY
Status: DISCONTINUED | OUTPATIENT
Start: 2024-11-05 | End: 2024-11-05 | Stop reason: HOSPADM

## 2024-11-05 RX ORDER — REGADENOSON 0.08 MG/ML
0.4 INJECTION, SOLUTION INTRAVENOUS
Status: COMPLETED | OUTPATIENT
Start: 2024-11-05 | End: 2024-11-05

## 2024-11-05 RX ADMIN — SODIUM CHLORIDE, PRESERVATIVE FREE 10 ML: 5 INJECTION INTRAVENOUS at 08:31

## 2024-11-05 RX ADMIN — TETRAKIS(2-METHOXYISOBUTYLISOCYANIDE)COPPER(I) TETRAFLUOROBORATE 8.8 MILLICURIE: 1 INJECTION, POWDER, LYOPHILIZED, FOR SOLUTION INTRAVENOUS at 10:10

## 2024-11-05 RX ADMIN — TETRAKIS(2-METHOXYISOBUTYLISOCYANIDE)COPPER(I) TETRAFLUOROBORATE 29.1 MILLICURIE: 1 INJECTION, POWDER, LYOPHILIZED, FOR SOLUTION INTRAVENOUS at 12:06

## 2024-11-05 RX ADMIN — CARVEDILOL 3.12 MG: 3.12 TABLET, FILM COATED ORAL at 08:30

## 2024-11-05 RX ADMIN — ACETAMINOPHEN 650 MG: 325 TABLET ORAL at 02:23

## 2024-11-05 RX ADMIN — REGADENOSON 0.4 MG: 0.08 INJECTION, SOLUTION INTRAVENOUS at 12:06

## 2024-11-05 RX ADMIN — ENOXAPARIN SODIUM 30 MG: 100 INJECTION SUBCUTANEOUS at 08:30

## 2024-11-05 RX ADMIN — CARVEDILOL 3.12 MG: 3.12 TABLET, FILM COATED ORAL at 17:43

## 2024-11-05 RX ADMIN — SODIUM CHLORIDE, PRESERVATIVE FREE 10 ML: 5 INJECTION INTRAVENOUS at 08:32

## 2024-11-05 ASSESSMENT — PAIN SCALES - GENERAL
PAINLEVEL_OUTOF10: 0
PAINLEVEL_OUTOF10: 8
PAINLEVEL_OUTOF10: 6

## 2024-11-05 ASSESSMENT — PAIN SCALES - WONG BAKER: WONGBAKER_NUMERICALRESPONSE: HURTS LITTLE MORE

## 2024-11-05 ASSESSMENT — PAIN DESCRIPTION - LOCATION: LOCATION: BACK;SHOULDER

## 2024-11-05 ASSESSMENT — PAIN DESCRIPTION - DESCRIPTORS: DESCRIPTORS: ACHING;DULL

## 2024-11-05 ASSESSMENT — PAIN DESCRIPTION - ORIENTATION: ORIENTATION: LEFT

## 2024-11-05 NOTE — PLAN OF CARE
Problem: Discharge Planning  Goal: Discharge to home or other facility with appropriate resources  11/4/2024 2234 by Russel Posadas RN  Outcome: Progressing  11/4/2024 1218 by Zenobia Huerta RN  Outcome: Progressing  Flowsheets (Taken 11/4/2024 1218)  Discharge to home or other facility with appropriate resources:   Identify barriers to discharge with patient and caregiver   Arrange for needed discharge resources and transportation as appropriate   Identify discharge learning needs (meds, wound care, etc)   Refer to discharge planning if patient needs post-hospital services based on physician order or complex needs related to functional status, cognitive ability or social support system     Problem: Safety - Adult  Goal: Free from fall injury  11/4/2024 2234 by Russel Posadas RN  Outcome: Progressing  11/4/2024 1218 by Zenobia Huerta RN  Outcome: Progressing     Problem: Pain  Goal: Verbalizes/displays adequate comfort level or baseline comfort level  11/4/2024 2234 by Russel Posadas RN  Outcome: Progressing  11/4/2024 1218 by Zenobia Huerta RN  Outcome: Progressing  Flowsheets (Taken 11/4/2024 1218)  Verbalizes/displays adequate comfort level or baseline comfort level:   Encourage patient to monitor pain and request assistance   Assess pain using appropriate pain scale   Administer analgesics based on type and severity of pain and evaluate response   Implement non-pharmacological measures as appropriate and evaluate response   Consider cultural and social influences on pain and pain management   Notify Licensed Independent Practitioner if interventions unsuccessful or patient reports new pain     Problem: Chronic Conditions and Co-morbidities  Goal: Patient's chronic conditions and co-morbidity symptoms are monitored and maintained or improved  11/4/2024 2234 by Russel Posadas RN  Outcome: Progressing  11/4/2024 1218 by Zenobia Huerta RN  Outcome: Progressing  Flowsheets (Taken 11/4/2024 1218)  Care Plan - Patient's

## 2024-11-05 NOTE — DISCHARGE SUMMARY
Hospitalist Discharge Summary    Patient: Veronica Melgoza MRN: [id number]  CSN: [id number]    YOB: 1945  Age: 78 y.o.  Sex: female    DOA: 11/3/2024 LOS:  LOS: 2 days   Discharge Date:      Primary Care Provider:  aNe Montgomery MD    Admission Diagnoses: Chest pain [R07.9]  Elevated troponin [R79.89]  S/p nephrectomy [Z90.5]  Coronary artery disease involving native coronary artery of native heart without angina pectoris [I25.10]  Chest pain, unspecified type [R07.9]  Chronic kidney disease, unspecified CKD stage [N18.9]    Discharge Diagnoses:    Active Hospital Problems    Diagnosis     CKD (chronic kidney disease) [N18.9]      Priority: Low    Tobacco user [Z72.0]      Priority: Low    DM (diabetes mellitus) ([facility]) [E11.9]     Pleural effusion [J90]        Discharge Medications:        Medication List        CONTINUE taking these medications      allopurinol 300 MG tablet  Commonly known as: ZYLOPRIM     ALPRAZolam 0.5 MG tablet  Commonly known as: XANAX     amLODIPine 5 MG tablet  Commonly known as: NORVASC     atorvastatin 20 MG tablet  Commonly known as: LIPITOR     Cholecalciferol 10 MCG (400 UNIT) Caps Capsule  Commonly known as: Vitamin D     escitalopram 10 MG tablet  Commonly known as: LEXAPRO     fenofibrate 145 MG tablet  Commonly known as: TRICOR     Fish Oil 300 MG Caps     fluticasone-salmeterol 250-50 MCG/ACT Aepb diskus inhaler  Commonly known as: ADVAIR     hydroCHLOROthiazide 25 MG tablet  Commonly known as: HYDRODIURIL     ibuprofen 400 MG tablet  Commonly known as: ADVIL;MOTRIN     montelukast 10 MG tablet  Commonly known as: SINGULAIR     potassium chloride 20 MEQ extended release tablet  Commonly known as: KLOR-CON M     pregabalin 25 MG capsule  Commonly known as: LYRICA     SITagliptin 50 MG tablet  Commonly known as: JANUVIA     tiotropium 2.5

## 2024-11-05 NOTE — PROGRESS NOTES
[facility] Hospitalist Service  At the heart of better care     Advance Care Planning   Admit Date:  11/3/2024  4:06 PM   Name:  Veronica Melgoza   Age:  78 y.o.  Sex:  female  :  1945   MRN:  [id number]   Room:  [facility]    Veronica Melgoza has capacity to make her own decisions:   Yes    If pt unable to make decisions, POA/surrogate decision maker:  N/A    Other people present:   Nursing    Patient / surrogate decision-maker directed code status:  DNR/DNI    Other ACP topics discussed, if applicable:   None    Patient or surrogate consented to discussion of the current conditions, workup, management plans, prognosis, and the risk for further deterioration.  Time spent: 16 minutes in direct discussion.      Signed:  Cheko Marin MD   
OCCUPATIONAL THERAPY EVALUATION/DISCHARGE    Patient: Veronica Melgoza (78 y.o. 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     ASSESSMENT AND RECOMMENDATIONS:  Patient presented seated EOB with gripper socks and cardiac monitor. Patient with no visitors present for entire session. Patient completed assessment of ADLs and BUE strength, ROM (see details below). Patient reports already completed toileting and hygiene care this am. Patient demonstrated functional mobility, no AD, GB, some furniture walking, reports this is was she does for short distances at home. Patient reports feels was able to complete ADLs in bathroom at baseline level this am. Patient pending stress test. No further OT, patient appears to be at baseline for ADLs.    Maximum therapeutic gains met at current level of care and patient will be discharged from occupational therapy at this time.    Further Equipment Recommendations for Discharge:  ,          AMPAC: AM-PAC Inpatient Daily Activity Raw Score: 24/24 -Discharge Recommendations: Home with assist PRN   At this time and based on an AM-PAC score, no further OT is recommended upon discharge due to patient at baseline functional status.  Recommend patient returns to prior setting with prior services.    This [facility] score should be considered in conjunction with interdisciplinary team recommendations to determine the most appropriate discharge setting. Patient's social 
PT order received and chart reviewed. Pt unable to be seen for skilled PT evaluation due to ZENIA for stress test.  Will follow up as pt schedule allows.  Flaca Alonzo, PT, DPT    
Physical Therapy Goals:  Initiated 11/5/2024 to be met within 7-10 days.  Short Term Goals  Short Term Goal 1: Patient will perform supine to/from sit with independence  Short Term Goal 2: Patient will perform sit to/from stand with independence in prep for gait progression  Short Term Goal 3: Patient will ambulate 50 ft with LRAD and modified independence to progress OOB mobility  Short Term Goal 4: Patient will negotiate 1 stairs with handrails and supervision to simulate curb negotiation  PHYSICAL THERAPY EVALUATION    Patient: Veronica Melgoza (78 y.o. female)  Date: 11/5/2024  Diagnosis: Chest pain [R07.9]  Elevated troponin [R79.89]  S/p nephrectomy [Z90.5]  Coronary artery disease involving native coronary artery of native heart without angina pectoris [I25.10]  Chest pain, unspecified type [R07.9]  Chronic kidney disease, unspecified CKD stage [N18.9] Chest pain  Precautions: Fall Risk  PLOF: Modified independent ambulation with rollator due to chronic back pain.    ASSESSMENT :  Patient received seated EOB. Pt presents with decreased LE strength, decreased endurance, decreased gait quailty, decreased activity tolerance, impaired balance, and high pain levels limiting functional mobility. Patient reports chronic back pain and is overdue for her steroid injection thus is having severe pain pain and declined long distance ambulation. Patient performed sit to/from stand with stand by assist. Patient ambulated 20 ft with rolling walker and stand by assist. Patient appears near baseline level and would benefit from continued skilled PT to progress functional mobility.     DEFICITS/IMPAIRMENTS:    , Body Structures, Functions, Activity Limitations Requiring Skilled Therapeutic Intervention: Decreased functional mobility ;Decreased ROM;Decreased strength;Decreased endurance;Decreased balance;Increased pain;Decreased posture    Patient will benefit from skilled intervention to address the above 
TRANSFER - IN REPORT:    Verbal report received from LOBITO Carrasco on Veronica Melgoza  being received from ED for routine progression of patient care      Report consisted of patient's Situation, Background, Assessment and   Recommendations(SBAR).     Information from the following report(s) ED Encounter Summary, ED SBAR, MAR, Recent Results, and Neuro Assessment was reviewed with the receiving nurse.    Opportunity for questions and clarification was provided.      Assessment completed upon patient's arrival to unit and care assumed.     Vital signs taken on arrival to unit and admission database complete to include med rec. All questions and concerns addressed at this time. Call bell explained and placed within reach. Bed locked and in lowest position.    Dr. Marin in to assess patient at this time.    
chills.  Cardiovascular: No chest pain or pressure. No palpitations.   Pulmonary: No shortness of breath.   Gastrointestinal: No nausea, vomiting.     Physical Exam: As above and,  General: Awake, cooperative, no acute distress    HEENT: NC, Atraumatic.  PERRLA, anicteric sclerae.  Lungs: CTA Bilaterally. No Wheezing/Rhonchi/Rales.  Heart:  S1 S2,  No murmur, No Rubs, No Gallops  Abdomen: Soft, Non distended, Non tender.  +Bowel sounds,   Extremities: No c/c/e  Psych:   Not anxious or agitated.  Neurologic:  No acute neurological deficit.         Vital signs/Intake and Output:  Visit Vitals  BP (!) 126/93   Pulse 82   Temp 98.4 °F (36.9 °C) (Oral)   Resp 20   Ht 1.626 m (5' 4\")   Wt 102.1 kg (225 lb)   SpO2 98%   BMI 38.62 kg/m²     Current Shift:  No intake/output data recorded.  Last three shifts:  11/02 1901 - 11/04 0700  In: 75 [P.O.:75]  Out: -             Labs: Results:       Chemistry Recent Labs     11/03/24  1732 11/04/24  0150   * 136   K 3.3* 3.3*    102   CO2 28 28   BUN 19* 20*   GLOB 4.5* 5.0*   ,No results found for: \"LACTA\"   CBC w/Diff Recent Labs     11/03/24  1732 11/04/24  0150   WBC 12.9 9.3   RBC 3.93* 3.77*   HGB 11.9* 11.4*   HCT 35.3 34.2*    198      Cardiac Enzymes Lab Results   Component Value Date    TROPHS 94 (H) 11/04/2024   ,No results found for: \"BNP\"   Coagulation No results for input(s): \"INR\", \"APTT\" in the last 72 hours.    Invalid input(s): \"PTP\"    Lipid Panel No results found for: \"CHOL\", \"CHOLPOCT\", \"CHLST\", \"CHOLV\", \"652330\", \"HDL\", \"HDLC\", \"LDL\", \"LDLC\", \"VLDLC\", \"VLDL\"   Pancreas No results for input(s): \"LIPASE\" in the last 72 hours.,No results for input(s): \"AMYLASE\" in the last 72 hours.   Liver Enzymes No results for input(s): \"TP\" in the last 72 hours.    Invalid input(s): \"ALB\", \"TBIL\", \"AP\", \"SGOT\", \"GPT\", \"DBIL\"   Thyroid Studies No results found for: \"T4\", \"T3RU\", \"TSH\"     Procedures/imaging: see electronic medical records for all 
procedures/Xrays   and details which were not copied into this note but were reviewed prior to creation of Plan        Recent Labs     11/03/24  1732 11/04/24  0150 11/05/24  0312   WBC 12.9 9.3 9.2   HGB 11.9* 11.4* 11.2*   HCT 35.3 34.2* 33.7*    198 202     Recent Labs     11/03/24  1732 11/04/24  0150 11/05/24  0312   * 136 136   K 3.3* 3.3* 3.6    102 106   CO2 28 28 24   BUN 19* 20* 20*       RADIOLOGY:  CT CHEST WO CONTRAST    Result Date: 11/3/2024  No pleural effusion or evidence of pneumonia. Diffuse coronary calcifications. No parenchymal airspace disease. Electronically signed by DELMA LOFTON    NM LUNG SCAN PERFUSION ONLY    Result Date: 11/3/2024  Normal perfusion lung imaging study. Probability of pulmonary embolism is low. Electronically signed by PhotofySAIN    XR CHEST PORTABLE    Result Date: 11/3/2024  1. Small left pleural effusion and left basilar atelectasis. Electronically signed by Thor Kaur          Cardiology Procedures:   No results found for this or any previous visit. 11/03/24    ECHO (TTE) COMPLETE (PRN CONTRAST/BUBBLE/STRAIN/3D) 11/04/2024  6:29 PM (Final)    Interpretation Summary    Left Ventricle: Mildly reduced left ventricular systolic function with a visually estimated EF of 50 - 55%. EF by 2D Simpsons Biplane is 48%. Left ventricle size is normal. Normal wall thickness. Normal wall motion. Diastolic dysfunction present with normal LV EF. Average E/e' ratio is 16.30.    Aortic Valve: Trileaflet valve. Mild sclerosis of the aortic valve cusps.    Mitral Valve: Mildly thickened leaflets. Mild regurgitation.    Tricuspid Valve: Mild regurgitation. The estimated RVS/PAS pressure is 23 mmHg.    Left Atrium: Left atrium size is normal. LA Vol Index is  25 ml/m2.    Signed by: Esa Rutledge MD on 11/4/2024  6:29 PM    11/03/24    NM STRESS TEST WITH MYOCARDIAL PERFUSION 11/05/2024  3:50 PM (Final)    Interpretation Summary    Stress Combined Conclusion: The 
rhythm with nonspecific ST and T changes.  Non-diagnostic EKG changes during pharmacological stress test.  No arrhythmia during the stress test.  Nuclear images are compromised with motion and diaphragmatic attenuation artifact.  No evidence of significant reversible ischemia or infarction on nuclear images.  Ejection fraction is at 58%.    Signed by: Esa Rutledge MD on 11/5/2024  3:50 PM                Signed By: ESA RUTLEDGE MD     November 5, 2024

## 2024-11-05 NOTE — PLAN OF CARE
Problem: Discharge Planning  Goal: Discharge to home or other facility with appropriate resources  11/5/2024 0909 by Luly Rhoades RN  Outcome: Progressing  Flowsheets (Taken 11/5/2024 0800)  Discharge to home or other facility with appropriate resources:   Identify barriers to discharge with patient and caregiver   Arrange for needed discharge resources and transportation as appropriate   Identify discharge learning needs (meds, wound care, etc)  11/4/2024 2234 by Russel Posadas RN  Outcome: Progressing     Problem: Safety - Adult  Goal: Free from fall injury  11/5/2024 0909 by Luly Rhoades RN  Outcome: Progressing  11/4/2024 2234 by Russel Posadas RN  Outcome: Progressing     Problem: Pain  Goal: Verbalizes/displays adequate comfort level or baseline comfort level  11/5/2024 0909 by Luly Rhoades RN  Outcome: Progressing  11/4/2024 2234 by Russel Posadas RN  Outcome: Progressing     Problem: Chronic Conditions and Co-morbidities  Goal: Patient's chronic conditions and co-morbidity symptoms are monitored and maintained or improved  11/5/2024 0909 by Luly Rhoades RN  Outcome: Progressing  Flowsheets (Taken 11/5/2024 0800)  Care Plan - Patient's Chronic Conditions and Co-Morbidity Symptoms are Monitored and Maintained or Improved:   Monitor and assess patient's chronic conditions and comorbid symptoms for stability, deterioration, or improvement   Collaborate with multidisciplinary team to address chronic and comorbid conditions and prevent exacerbation or deterioration   Update acute care plan with appropriate goals if chronic or comorbid symptoms are exacerbated and prevent overall improvement and discharge  11/4/2024 2234 by Russel Posadas RN  Outcome: Progressing